# Patient Record
Sex: FEMALE | Race: WHITE | NOT HISPANIC OR LATINO | Employment: FULL TIME | URBAN - METROPOLITAN AREA
[De-identification: names, ages, dates, MRNs, and addresses within clinical notes are randomized per-mention and may not be internally consistent; named-entity substitution may affect disease eponyms.]

---

## 2020-11-24 ENCOUNTER — OFFICE VISIT (OUTPATIENT)
Dept: FAMILY MEDICINE | Facility: CLINIC | Age: 60
End: 2020-11-24
Payer: COMMERCIAL

## 2020-11-24 VITALS
DIASTOLIC BLOOD PRESSURE: 86 MMHG | BODY MASS INDEX: 29.77 KG/M2 | HEART RATE: 80 BPM | HEIGHT: 68 IN | SYSTOLIC BLOOD PRESSURE: 120 MMHG | WEIGHT: 196.44 LBS | RESPIRATION RATE: 18 BRPM | TEMPERATURE: 98 F | OXYGEN SATURATION: 98 %

## 2020-11-24 DIAGNOSIS — R10.9 FLANK PAIN: Primary | ICD-10-CM

## 2020-11-24 DIAGNOSIS — E66.3 OVERWEIGHT (BMI 25.0-29.9): ICD-10-CM

## 2020-11-24 DIAGNOSIS — Z12.31 ENCOUNTER FOR SCREENING MAMMOGRAM FOR BREAST CANCER: ICD-10-CM

## 2020-11-24 DIAGNOSIS — R10.32 LEFT LOWER QUADRANT ABDOMINAL PAIN: ICD-10-CM

## 2020-11-24 LAB
BACTERIA #/AREA URNS AUTO: NORMAL /HPF
BILIRUB UR QL STRIP: NEGATIVE
CLARITY UR REFRACT.AUTO: CLEAR
COLOR UR AUTO: ABNORMAL
GLUCOSE UR QL STRIP: NEGATIVE
HGB UR QL STRIP: ABNORMAL
KETONES UR QL STRIP: NEGATIVE
LEUKOCYTE ESTERASE UR QL STRIP: ABNORMAL
MICROSCOPIC COMMENT: NORMAL
NITRITE UR QL STRIP: NEGATIVE
PH UR STRIP: 5 [PH] (ref 5–8)
PROT UR QL STRIP: NEGATIVE
RBC #/AREA URNS AUTO: 0 /HPF (ref 0–4)
SP GR UR STRIP: 1.01 (ref 1–1.03)
SQUAMOUS #/AREA URNS AUTO: 1 /HPF
URN SPEC COLLECT METH UR: ABNORMAL
WBC #/AREA URNS AUTO: 2 /HPF (ref 0–5)

## 2020-11-24 PROCEDURE — 3008F PR BODY MASS INDEX (BMI) DOCUMENTED: ICD-10-PCS | Mod: CPTII,S$GLB,, | Performed by: FAMILY MEDICINE

## 2020-11-24 PROCEDURE — 99203 PR OFFICE/OUTPT VISIT, NEW, LEVL III, 30-44 MIN: ICD-10-PCS | Mod: 25,S$GLB,, | Performed by: FAMILY MEDICINE

## 2020-11-24 PROCEDURE — 99999 PR PBB SHADOW E&M-EST. PATIENT-LVL IV: ICD-10-PCS | Mod: PBBFAC,,, | Performed by: FAMILY MEDICINE

## 2020-11-24 PROCEDURE — 1125F AMNT PAIN NOTED PAIN PRSNT: CPT | Mod: S$GLB,,, | Performed by: FAMILY MEDICINE

## 2020-11-24 PROCEDURE — 1125F PR PAIN SEVERITY QUANTIFIED, PAIN PRESENT: ICD-10-PCS | Mod: S$GLB,,, | Performed by: FAMILY MEDICINE

## 2020-11-24 PROCEDURE — 87086 URINE CULTURE/COLONY COUNT: CPT

## 2020-11-24 PROCEDURE — 81002 URINALYSIS NONAUTO W/O SCOPE: CPT | Mod: S$GLB,,, | Performed by: FAMILY MEDICINE

## 2020-11-24 PROCEDURE — 81002 POCT URINE DIPSTICK WITHOUT MICROSCOPE: ICD-10-PCS | Mod: S$GLB,,, | Performed by: FAMILY MEDICINE

## 2020-11-24 PROCEDURE — 99203 OFFICE O/P NEW LOW 30 MIN: CPT | Mod: 25,S$GLB,, | Performed by: FAMILY MEDICINE

## 2020-11-24 PROCEDURE — 3008F BODY MASS INDEX DOCD: CPT | Mod: CPTII,S$GLB,, | Performed by: FAMILY MEDICINE

## 2020-11-24 PROCEDURE — 81001 URINALYSIS AUTO W/SCOPE: CPT

## 2020-11-24 PROCEDURE — 99999 PR PBB SHADOW E&M-EST. PATIENT-LVL IV: CPT | Mod: PBBFAC,,, | Performed by: FAMILY MEDICINE

## 2020-11-24 NOTE — PROGRESS NOTES
Subjective:       Patient ID: Juli Fajardo is a 60 y.o. female.    Chief Complaint: Abdominal Pain    This patient is new to me and has not been in the clinic in over 5 years.   Rosalva presents to the clinic today with complaints of left lower side pain. Patient states it comes and goes for about am onth now. Patient states she has been cleaning house and thought it may be a muscle strain but it has persisted. Patient states she started menopause 10 years ago or so and has not had a period for some times now. Patient states the pain is like a burning but not burning on urination.   Patient educated on plan of care, verbalized understanding.    Review of Systems   Constitutional: Negative for activity change, appetite change, chills, diaphoresis and fever.   HENT: Negative for congestion, ear pain, postnasal drip, sinus pressure, sneezing and sore throat.    Eyes: Negative for pain, discharge, redness and itching.   Respiratory: Negative for apnea, cough, chest tightness, shortness of breath and wheezing.    Cardiovascular: Negative for chest pain and leg swelling.   Gastrointestinal: Negative for abdominal distention, abdominal pain, constipation, diarrhea, nausea and vomiting.   Genitourinary: Positive for pelvic pain. Negative for difficulty urinating, dysuria, flank pain and frequency.   Skin: Negative for color change, rash and wound.   Neurological: Negative for dizziness.       Patient Active Problem List   Diagnosis    ALLERGIC RHINITIS       Objective:      Physical Exam  Vitals signs reviewed.   Constitutional:       General: She is not in acute distress.     Appearance: Normal appearance. She is well-developed.   HENT:      Head: Normocephalic.      Nose: Nose normal.   Eyes:      Conjunctiva/sclera: Conjunctivae normal.      Pupils: Pupils are equal, round, and reactive to light.   Neck:      Musculoskeletal: Normal range of motion and neck supple.   Cardiovascular:      Rate and Rhythm: Normal rate and  regular rhythm.      Heart sounds: Normal heart sounds.   Pulmonary:      Effort: Pulmonary effort is normal. No respiratory distress.      Breath sounds: Normal breath sounds.   Abdominal:      General: There is no distension.      Palpations: Abdomen is soft.      Tenderness: There is no abdominal tenderness. There is no right CVA tenderness, left CVA tenderness, guarding or rebound. Negative signs include Reddy's sign and McBurney's sign.      Comments: LLQ pain reported. No pain on palpation.    Skin:     General: Skin is warm and dry.      Findings: No rash.   Neurological:      Mental Status: She is alert and oriented to person, place, and time.   Psychiatric:         Mood and Affect: Mood normal.         Behavior: Behavior normal.         Lab Results   Component Value Date    WBC 5.52 01/28/2012    HGB 13.2 01/28/2012    HCT 41.0 01/28/2012     01/28/2012    CHOL 197 01/28/2012    TRIG 82 01/28/2012    HDL 69 01/28/2012    ALT 20 01/28/2012    AST 29 01/28/2012     01/28/2012    K 4.1 01/28/2012     01/28/2012    CREATININE 0.7 01/28/2012    BUN 12 01/28/2012    CO2 24 01/28/2012    TSH 1.488 01/28/2012     The ASCVD Risk score (Taylor DC Jr., et al., 2013) failed to calculate for the following reasons:    Cannot find a previous HDL lab    Cannot find a previous total cholesterol lab    Assessment:       1. Flank pain    2. Left lower quadrant abdominal pain    3. Overweight (BMI 25.0-29.9)    4. Encounter for screening mammogram for breast cancer        Plan:       Juli was seen today for abdominal pain.    Diagnoses and all orders for this visit:    Flank pain  -     POCT urine dipstick without microscope  -     Urinalysis  -     Urinalysis Microscopic  -     Urine culture    Left lower quadrant abdominal pain  -     US Pelvis Complete Non OB; Future    Overweight (BMI 25.0-29.9)  Comments:  today 29.87  Continue healthy diet and regular exercise as tolerated.  Continue medications as  prescribed.  Follow up with PCP    Encounter for screening mammogram for breast cancer  -     Mammo Digital Screening Bilat w/ Ted; Future      Follow up if symptoms worsen or fail to improve.

## 2020-11-25 ENCOUNTER — HOSPITAL ENCOUNTER (OUTPATIENT)
Dept: RADIOLOGY | Facility: HOSPITAL | Age: 60
Discharge: HOME | End: 2020-11-25
Attending: FAMILY MEDICINE
Payer: COMMERCIAL

## 2020-11-25 DIAGNOSIS — R10.32 LEFT LOWER QUADRANT ABDOMINAL PAIN: ICD-10-CM

## 2020-11-25 LAB
BILIRUB SERPL-MCNC: ABNORMAL MG/DL
BLOOD URINE, POC: ABNORMAL
CLARITY, POC UA: CLEAR
COLOR, POC UA: YELLOW
GLUCOSE UR QL STRIP: ABNORMAL
KETONES UR QL STRIP: ABNORMAL
LEUKOCYTE ESTERASE URINE, POC: ABNORMAL
NITRITE, POC UA: ABNORMAL
PH, POC UA: 5
PROTEIN, POC: ABNORMAL
SPECIFIC GRAVITY, POC UA: 1.01
UROBILINOGEN, POC UA: ABNORMAL

## 2020-11-25 PROCEDURE — 76856 US PELVIS COMP WITH TRANSVAG NON-OB (XPD): ICD-10-PCS | Mod: 26,,, | Performed by: RADIOLOGY

## 2020-11-25 PROCEDURE — 76830 US PELVIS COMP WITH TRANSVAG NON-OB (XPD): ICD-10-PCS | Mod: 26,,, | Performed by: RADIOLOGY

## 2020-11-25 PROCEDURE — 76830 TRANSVAGINAL US NON-OB: CPT | Mod: 26,,, | Performed by: RADIOLOGY

## 2020-11-25 PROCEDURE — 76856 US EXAM PELVIC COMPLETE: CPT | Mod: 26,,, | Performed by: RADIOLOGY

## 2020-11-25 PROCEDURE — 76830 TRANSVAGINAL US NON-OB: CPT | Mod: TC

## 2020-11-26 LAB — BACTERIA UR CULT: NO GROWTH

## 2020-11-30 ENCOUNTER — HOSPITAL ENCOUNTER (OUTPATIENT)
Dept: RADIOLOGY | Facility: HOSPITAL | Age: 60
Discharge: HOME | End: 2020-11-30
Attending: FAMILY MEDICINE
Payer: COMMERCIAL

## 2020-11-30 DIAGNOSIS — Z12.31 ENCOUNTER FOR SCREENING MAMMOGRAM FOR BREAST CANCER: ICD-10-CM

## 2020-11-30 PROCEDURE — 77067 MAMMO DIGITAL SCREENING BILAT WITH TOMO: ICD-10-PCS | Mod: 26,,, | Performed by: RADIOLOGY

## 2020-11-30 PROCEDURE — 77063 MAMMO DIGITAL SCREENING BILAT WITH TOMO: ICD-10-PCS | Mod: 26,,, | Performed by: RADIOLOGY

## 2020-11-30 PROCEDURE — 77063 BREAST TOMOSYNTHESIS BI: CPT | Mod: 26,,, | Performed by: RADIOLOGY

## 2020-11-30 PROCEDURE — 77067 SCR MAMMO BI INCL CAD: CPT | Mod: TC

## 2020-11-30 PROCEDURE — 77067 SCR MAMMO BI INCL CAD: CPT | Mod: 26,,, | Performed by: RADIOLOGY

## 2020-12-01 ENCOUNTER — OFFICE VISIT (OUTPATIENT)
Dept: FAMILY MEDICINE | Facility: CLINIC | Age: 60
End: 2020-12-01
Payer: COMMERCIAL

## 2020-12-01 VITALS
DIASTOLIC BLOOD PRESSURE: 82 MMHG | TEMPERATURE: 98 F | HEIGHT: 68 IN | SYSTOLIC BLOOD PRESSURE: 126 MMHG | RESPIRATION RATE: 18 BRPM | WEIGHT: 196.44 LBS | BODY MASS INDEX: 29.77 KG/M2 | OXYGEN SATURATION: 98 % | HEART RATE: 81 BPM

## 2020-12-01 DIAGNOSIS — Z01.419 ENCOUNTER FOR WELL WOMAN EXAM WITH ROUTINE GYNECOLOGICAL EXAM: Primary | ICD-10-CM

## 2020-12-01 DIAGNOSIS — E66.3 OVERWEIGHT (BMI 25.0-29.9): ICD-10-CM

## 2020-12-01 PROCEDURE — 99396 PREV VISIT EST AGE 40-64: CPT | Mod: 25,S$GLB,, | Performed by: FAMILY MEDICINE

## 2020-12-01 PROCEDURE — 3008F BODY MASS INDEX DOCD: CPT | Mod: CPTII,S$GLB,, | Performed by: FAMILY MEDICINE

## 2020-12-01 PROCEDURE — 88175 CYTOPATH C/V AUTO FLUID REDO: CPT

## 2020-12-01 PROCEDURE — 3008F PR BODY MASS INDEX (BMI) DOCUMENTED: ICD-10-PCS | Mod: CPTII,S$GLB,, | Performed by: FAMILY MEDICINE

## 2020-12-01 PROCEDURE — 1126F AMNT PAIN NOTED NONE PRSNT: CPT | Mod: S$GLB,,, | Performed by: FAMILY MEDICINE

## 2020-12-01 PROCEDURE — 1126F PR PAIN SEVERITY QUANTIFIED, NO PAIN PRESENT: ICD-10-PCS | Mod: S$GLB,,, | Performed by: FAMILY MEDICINE

## 2020-12-01 PROCEDURE — 87624 HPV HI-RISK TYP POOLED RSLT: CPT

## 2020-12-01 PROCEDURE — 99999 PR PBB SHADOW E&M-EST. PATIENT-LVL IV: CPT | Mod: PBBFAC,,, | Performed by: FAMILY MEDICINE

## 2020-12-01 PROCEDURE — 99396 PR PREVENTIVE VISIT,EST,40-64: ICD-10-PCS | Mod: 25,S$GLB,, | Performed by: FAMILY MEDICINE

## 2020-12-01 PROCEDURE — 99999 PR PBB SHADOW E&M-EST. PATIENT-LVL IV: ICD-10-PCS | Mod: PBBFAC,,, | Performed by: FAMILY MEDICINE

## 2020-12-01 RX ORDER — LORATADINE 10 MG/1
TABLET ORAL
COMMUNITY

## 2020-12-03 NOTE — PROGRESS NOTES
Subjective:       Patient ID: Juli Fajardo is a 60 y.o. female.    Chief Complaint: Gynecologic Exam    Mrs Bustos presents to the clinic today for her woman's wellness exam. Patient reports she has not had a pap smear in a very long time. Patient has no complaints today.   Patient educated on plan of care, verbalized understanding.    Review of Systems   Constitutional: Negative for activity change, appetite change, chills, diaphoresis and fever.   HENT: Negative for congestion, ear pain, postnasal drip, sinus pressure, sneezing and sore throat.    Eyes: Negative for pain, discharge, redness and itching.   Respiratory: Negative for apnea, cough, chest tightness, shortness of breath and wheezing.    Cardiovascular: Negative for chest pain and leg swelling.   Gastrointestinal: Negative for abdominal distention, abdominal pain, constipation, diarrhea, nausea and vomiting.   Genitourinary: Negative for difficulty urinating, dysuria, flank pain and frequency.   Skin: Negative for color change, rash and wound.   Neurological: Negative for dizziness.       Patient Active Problem List   Diagnosis    ALLERGIC RHINITIS      Routine Gyn Exam: Patient here for routine exam.      Gynecologic History  No LMP recorded. Patient is postmenopausal.  Contraception: none  Last Pap: years ago  Results: normal  Last Mammogram: years ago  Results: normal      Objective:      Physical Exam  Exam conducted with a chaperone present.   Constitutional:       General: She is not in acute distress.     Appearance: She is well-developed.   HENT:      Head: Normocephalic.      Right Ear: External ear normal.      Left Ear: External ear normal.      Nose: Nose normal.   Eyes:      Conjunctiva/sclera: Conjunctivae normal.      Pupils: Pupils are equal, round, and reactive to light.   Neck:      Musculoskeletal: Normal range of motion and neck supple.   Cardiovascular:      Rate and Rhythm: Normal rate.   Pulmonary:      Effort: Pulmonary effort is  normal. No respiratory distress.   Abdominal:      General: There is no distension.      Palpations: Abdomen is soft.      Tenderness: There is no abdominal tenderness.   Genitourinary:     General: Normal vulva.      Exam position: Lithotomy position.      Vagina: Normal.      Cervix: Normal.      Uterus: Normal.       Rectum: Normal.   Skin:     General: Skin is warm and dry.      Findings: No rash.   Neurological:      Mental Status: She is alert and oriented to person, place, and time.   Psychiatric:         Behavior: Behavior normal.         Lab Results   Component Value Date    WBC 5.52 01/28/2012    HGB 13.2 01/28/2012    HCT 41.0 01/28/2012     01/28/2012    CHOL 197 01/28/2012    TRIG 82 01/28/2012    HDL 69 01/28/2012    ALT 20 01/28/2012    AST 29 01/28/2012     01/28/2012    K 4.1 01/28/2012     01/28/2012    CREATININE 0.7 01/28/2012    BUN 12 01/28/2012    CO2 24 01/28/2012    TSH 1.488 01/28/2012     The ASCVD Risk score (Annette CURLY Jr., et al., 2013) failed to calculate for the following reasons:    Cannot find a previous HDL lab    Cannot find a previous total cholesterol lab    Assessment:       1. Encounter for well woman exam with routine gynecological exam    2. Overweight (BMI 25.0-29.9)        Plan:       Juli was seen today for gynecologic exam.    Diagnoses and all orders for this visit:    Encounter for well woman exam with routine gynecological exam  -     Liquid-Based Pap Smear, Screening  -     HPV High Risk Genotypes, PCR    Overweight (BMI 25.0-29.9)  Body mass index is 29.87 kg/m².  Continue healthy diet and regular exercise as tolerated.  Continue medications as prescribed.  Follow up with PCP      Follow up in about 1 year (around 12/1/2021).

## 2020-12-09 LAB
HPV HR 12 DNA SPEC QL NAA+PROBE: NEGATIVE
HPV16 AG SPEC QL: NEGATIVE
HPV18 DNA SPEC QL NAA+PROBE: NEGATIVE

## 2020-12-22 ENCOUNTER — TELEPHONE (OUTPATIENT)
Dept: FAMILY MEDICINE | Facility: CLINIC | Age: 60
End: 2020-12-22

## 2020-12-22 NOTE — TELEPHONE ENCOUNTER
Advised patient we are still waiting on results from the pap to come back and we will contact her once it has been received. Patient verbalized understanding.

## 2021-01-14 LAB
FINAL PATHOLOGIC DIAGNOSIS: NORMAL
Lab: NORMAL

## 2021-05-10 ENCOUNTER — PATIENT MESSAGE (OUTPATIENT)
Dept: RESEARCH | Facility: HOSPITAL | Age: 61
End: 2021-05-10

## 2022-02-11 NOTE — TELEPHONE ENCOUNTER
----- Message from Kate Navarro sent at 12/21/2020  4:52 PM CST -----  Regarding: results  Contact: [phone]  Pt is calling to find out the results or the test taken  on 11/30/2020 . Please contact pt back  [phone]     [employer] Electronic PA form for Prescription Benefits received from [employer].     Form completed and signed by Dr. Atif Clark. Form as well as appeal letter and most recent OV note faxed to [employer] at [phone].     For any updates on PA form and appeal may call [employer] at [phone].

## 2023-11-06 ENCOUNTER — OFFICE VISIT (OUTPATIENT)
Dept: FAMILY MEDICINE | Facility: CLINIC | Age: 63
End: 2023-11-06
Payer: COMMERCIAL

## 2023-11-06 ENCOUNTER — PATIENT MESSAGE (OUTPATIENT)
Dept: GASTROENTEROLOGY | Facility: CLINIC | Age: 63
End: 2023-11-06
Payer: COMMERCIAL

## 2023-11-06 VITALS
SYSTOLIC BLOOD PRESSURE: 134 MMHG | BODY MASS INDEX: 30.04 KG/M2 | WEIGHT: 198.19 LBS | HEART RATE: 91 BPM | HEIGHT: 68 IN | RESPIRATION RATE: 18 BRPM | TEMPERATURE: 98 F | DIASTOLIC BLOOD PRESSURE: 82 MMHG | OXYGEN SATURATION: 98 %

## 2023-11-06 DIAGNOSIS — Z12.12 ENCOUNTER FOR SCREENING FOR COLORECTAL MALIGNANT NEOPLASM: ICD-10-CM

## 2023-11-06 DIAGNOSIS — Z12.31 ENCOUNTER FOR SCREENING MAMMOGRAM FOR MALIGNANT NEOPLASM OF BREAST: ICD-10-CM

## 2023-11-06 DIAGNOSIS — Z76.89 ENCOUNTER TO ESTABLISH CARE: ICD-10-CM

## 2023-11-06 DIAGNOSIS — Z12.11 ENCOUNTER FOR SCREENING FOR COLORECTAL MALIGNANT NEOPLASM: ICD-10-CM

## 2023-11-06 DIAGNOSIS — E78.00 PURE HYPERCHOLESTEROLEMIA: ICD-10-CM

## 2023-11-06 DIAGNOSIS — R73.03 PREDIABETES: Primary | ICD-10-CM

## 2023-11-06 PROCEDURE — 3079F PR MOST RECENT DIASTOLIC BLOOD PRESSURE 80-89 MM HG: ICD-10-PCS | Mod: CPTII,S$GLB,, | Performed by: STUDENT IN AN ORGANIZED HEALTH CARE EDUCATION/TRAINING PROGRAM

## 2023-11-06 PROCEDURE — 99213 OFFICE O/P EST LOW 20 MIN: CPT | Mod: S$GLB,,, | Performed by: STUDENT IN AN ORGANIZED HEALTH CARE EDUCATION/TRAINING PROGRAM

## 2023-11-06 PROCEDURE — 3008F BODY MASS INDEX DOCD: CPT | Mod: CPTII,S$GLB,, | Performed by: STUDENT IN AN ORGANIZED HEALTH CARE EDUCATION/TRAINING PROGRAM

## 2023-11-06 PROCEDURE — 1159F PR MEDICATION LIST DOCUMENTED IN MEDICAL RECORD: ICD-10-PCS | Mod: CPTII,S$GLB,, | Performed by: STUDENT IN AN ORGANIZED HEALTH CARE EDUCATION/TRAINING PROGRAM

## 2023-11-06 PROCEDURE — 99213 PR OFFICE/OUTPT VISIT, EST, LEVL III, 20-29 MIN: ICD-10-PCS | Mod: S$GLB,,, | Performed by: STUDENT IN AN ORGANIZED HEALTH CARE EDUCATION/TRAINING PROGRAM

## 2023-11-06 PROCEDURE — 1159F MED LIST DOCD IN RCRD: CPT | Mod: CPTII,S$GLB,, | Performed by: STUDENT IN AN ORGANIZED HEALTH CARE EDUCATION/TRAINING PROGRAM

## 2023-11-06 PROCEDURE — 99999 PR PBB SHADOW E&M-EST. PATIENT-LVL V: ICD-10-PCS | Mod: PBBFAC,,, | Performed by: STUDENT IN AN ORGANIZED HEALTH CARE EDUCATION/TRAINING PROGRAM

## 2023-11-06 PROCEDURE — 1160F RVW MEDS BY RX/DR IN RCRD: CPT | Mod: CPTII,S$GLB,, | Performed by: STUDENT IN AN ORGANIZED HEALTH CARE EDUCATION/TRAINING PROGRAM

## 2023-11-06 PROCEDURE — 3075F SYST BP GE 130 - 139MM HG: CPT | Mod: CPTII,S$GLB,, | Performed by: STUDENT IN AN ORGANIZED HEALTH CARE EDUCATION/TRAINING PROGRAM

## 2023-11-06 PROCEDURE — 3075F PR MOST RECENT SYSTOLIC BLOOD PRESS GE 130-139MM HG: ICD-10-PCS | Mod: CPTII,S$GLB,, | Performed by: STUDENT IN AN ORGANIZED HEALTH CARE EDUCATION/TRAINING PROGRAM

## 2023-11-06 PROCEDURE — 99999 PR PBB SHADOW E&M-EST. PATIENT-LVL V: CPT | Mod: PBBFAC,,, | Performed by: STUDENT IN AN ORGANIZED HEALTH CARE EDUCATION/TRAINING PROGRAM

## 2023-11-06 PROCEDURE — 3008F PR BODY MASS INDEX (BMI) DOCUMENTED: ICD-10-PCS | Mod: CPTII,S$GLB,, | Performed by: STUDENT IN AN ORGANIZED HEALTH CARE EDUCATION/TRAINING PROGRAM

## 2023-11-06 PROCEDURE — 3079F DIAST BP 80-89 MM HG: CPT | Mod: CPTII,S$GLB,, | Performed by: STUDENT IN AN ORGANIZED HEALTH CARE EDUCATION/TRAINING PROGRAM

## 2023-11-06 PROCEDURE — 1160F PR REVIEW ALL MEDS BY PRESCRIBER/CLIN PHARMACIST DOCUMENTED: ICD-10-PCS | Mod: CPTII,S$GLB,, | Performed by: STUDENT IN AN ORGANIZED HEALTH CARE EDUCATION/TRAINING PROGRAM

## 2023-11-06 RX ORDER — BLOOD-GLUCOSE SENSOR
1 EACH MISCELLANEOUS
Qty: 1 EACH | Refills: 5 | Status: SHIPPED | OUTPATIENT
Start: 2023-11-06 | End: 2024-02-04

## 2023-11-06 NOTE — PROGRESS NOTES
[facility] Note    Subjective:  Chief Complaint:   Chief Complaint   Patient presents with    Establish Care       History of Present Illness:  Juli is a pleasant intelligent patient who is here for establishing care . Feels well today.     Recent diagnosis of prediabetes  A1c 5.9 (10/31/23)    HLD    Allergies  Flonase  Claritin    Mammo 3 years ago, will order  PAP, pt to schedule  CRC screening, due for first   Recommend COVID, RSV, Shingles vaccinations.     Screening  Health Maintenance         Date Due Completion Date    Hepatitis C Screening Never done ---    HIV Screening Never done ---    TETANUS VACCINE Never done ---    Colorectal Cancer Screening Never done ---    Shingles Vaccine (1 of 2) Never done ---    Lipid Panel 01/28/2017 1/28/2012    Mammogram 11/30/2021 11/30/2020    RSV Vaccine (Age 60+) (1 - 1-dose 60+ series) Never done ---    COVID-19 Vaccine (2 - 2023-24 season) 09/01/2023 3/13/2021    Cervical Cancer Screening 12/01/2025 12/1/2020          Immunization History   Administered Date(s) Administered    COVID-19, vector-nr, rS-Ad26, PF ([employer]) 03/13/2021     The ASCVD Risk score (Oren DK, et al., 2019) failed to calculate for the following reasons:    Cannot find a previous HDL lab    Cannot find a previous total cholesterol lab    Allergies:  Review of patient's allergies indicates:   Allergen Reactions    Ragweed Other (See Comments)       Home Medications:  Current Outpatient Medications on File Prior to Visit   Medication Sig    fluticasone propionate (FLONASE ALLERGY RELIEF NASL)     loratadine (CLARITIN) 10 mg tablet     multivit-iron-min-folic acid (MULTIVITAMIN-IRON-MINERALS-FOLIC ACID) 3,500-18-0.4 unit-mg-mg Chew Take by mouth.       No current facility-administered medications on file prior to visit.       Past Medical History:   Diagnosis Date    Allergy      History reviewed. No pertinent surgical history.  Family History   Problem Relation Age of Onset     "Diabetes Mother     Hypertension Mother     Heart disease Father     Breast cancer Maternal Grandmother      Social History     Tobacco Use    Smoking status: Never            The patient's past medical history, surgical history, social history, family history, allergies and medications have been reviewed.    Review of Systems     10 point review of systems was conducted and only the pertinent positives and pertinent negatives are noted above in the HPI section.    Physical Examination  General appearance: alert, cooperative, no distress  HEENT: normocephalic, atraumatic, PERRLA, TMs visualized bilaterally not impacted by cerumen, no nasal septal deviation, oropharynx mucosa pink and moist without tonsillar exudates  Neck: trachea midline, no LAD, no thyromegaly, no neck stiffness  Lungs: clear to auscultation, no wheezes, rales or rhonchi, symmetric air entry  Heart: normal rate, regular rhythm, normal S1, S2, no murmurs, rubs, clicks or gallops  Abdomen: soft, nontender, nondistended, no rigidity, rebound, or guarding.   Skin: No rashes or abnormal skin lesions, no apparent jaundice or bruising  Extremities: Full ROM of all extremities, peripheral pulses normal, no unilateral leg swelling or calf tenderness   Neurological:alert, oriented, normal speech, no new focal findings or movement disorder noted from baseline      BP Readings from Last 3 Encounters:   11/06/23 134/82   12/01/20 126/82   11/24/20 120/86     Wt Readings from Last 3 Encounters:   11/06/23 89.9 kg (198 lb 3.1 oz)   12/01/20 89.1 kg (196 lb 6.9 oz)   11/24/20 89.1 kg (196 lb 6.9 oz)     /82 (BP Location: Right arm, Patient Position: Sitting, BP Method: Medium (Manual))   Pulse 91   Temp 98.1 °F (36.7 °C) (Oral)   Resp 18   Ht 5' 8" (1.727 m)   Wt 89.9 kg (198 lb 3.1 oz)   SpO2 98%   BMI 30.14 kg/m²       274}  Laboratory: I have reviewed old labs below:       274}    Lab Results   Component Value Date    WBC 5.52 01/28/2012    HGB " 13.2 01/28/2012    HCT 41.0 01/28/2012    MCV 90.5 01/28/2012     01/28/2012     01/28/2012    K 4.1 01/28/2012     01/28/2012    CALCIUM 9.4 01/28/2012    CO2 24 01/28/2012    GLU 87 01/28/2012    BUN 12 01/28/2012    CREATININE 0.7 01/28/2012    ANIONGAP 13.0 01/28/2012    PROT 6.9 01/28/2012    ALBUMIN 3.9 01/28/2012    BILITOT 0.7 01/28/2012    ALKPHOS 50 (L) 01/28/2012    ALT 20 01/28/2012    AST 29 01/28/2012    CHOL 197 01/28/2012    TRIG 82 01/28/2012    HDL 69 01/28/2012    LDLCALC 112.0 01/28/2012    TSH 1.488 01/28/2012      Lab reviewed by me: Particular labs of significance that I will monitor, workup, or treat to improve are mentioned below in diagnostic impression remarks.    Imaging/EKG: I have reviewed the pertinent results and my findings are noted in remarks.     274}    CC:   Chief Complaint   Patient presents with    Establish Care           274}    Assessment/Plan  Juli Fajardo is a 63 y.o. female who presents to clinic with:  1. Prediabetes    2. Encounter to establish care    3. Pure hypercholesterolemia    4. BMI 30.0-30.9,adult    5. Encounter for screening for colorectal malignant neoplasm    6. Encounter for screening mammogram for malignant neoplasm of breast          274}  Diagnostic Impression Remarks       63 y.o. female who  has a past medical history of Allergy. presents to clinic today for est care and prediabetes education     This is the extent of this pleasant patient's concerns at this present time. She did not feel chest pain upon exertion, dyspnea, nausea, vomiting, diaphoresis, or syncope. No pleuritic chest pain, unilateral leg swelling, calf tenderness, or calf pain. Negative for unintentional weight loss night sweats, hematuria, and fevers. Juli will return to clinic in a few months for further workup and reassessment or sooner as needed. She was instructed to call the clinic or go to the emergency department or urgent care immediately if her symptoms  do not improve, worsens, or if any new symptoms develop. As we discussed that symptoms could worsen over the next 24 hours she was advised that if any increased swelling, pain, or numbness arise to go immediately to the ED. Patient knows to call any time if an emergency arises. Shared decision making occurred and she verbalized understanding in agreement with this plan. I discussed imaging findings, diagnosis, possibilities, treatment options, medications, risks, and benefits. She had many questions regarding the options and long-term effects. All questions were answered. She expressed understanding after counseling regarding the diagnosis and recommendations. She was capable and demonstrated competence with understanding of these options. Shared decision making was performed resulting in her choosing the current treatment plan. Patient handout was given with instructions and recommendations. Advised the patient that if they become pregnant to alert us immediately to assess for medication changes. Having a healthy weight can decrease the risk of 13 cancers and is an important goal. I also discussed the importance of close follow up to discuss labs, change or modify her medications if needed, monitor side effects, and further evaluation of medical problems.     Additional workup planned: see labs ordered below.    See below for labs and meds ordered with associated diagnosis      1. Encounter to establish care    2. Prediabetes  - Ambulatory referral/consult to Nutrition Services; Future  - blood-glucose sensor (FREESTYLE KD 3 SENSOR) Liane; 1 each by Misc.(Non-Drug; Combo Route) route every 14 (fourteen) days.  Dispense: 1 each; Refill: 5    3. Pure hypercholesterolemia  Stable. Monitor    4. BMI 30.0-30.9,adult  - Transition to a low salt, mediterranean-style diet which emphasizes:  Eating primarily plant-based foods, such as fruits and vegetables, whole grains, legumes and nuts   Replacing butter with healthy  fats, such as olive oil   Using herbs and spices instead of salt to flavor foods   Limiting red meat to no more than a few times a month   Eating fish and poultry at least twice a week      - Patient counseled on benefits of regular exercise.  Initiation of a graded aerobic exercise plan (goal 30-45 minutes per day 4-5 times per week)  Patient encouraged to participate in low intensity strength exercising and if unfamiliar with strength and conditioning training, I recommend joining a gym with access to a  to further instruct the patient.      - If weight/obesity is a concern a reasonable weight loss goal of 1-2lbs per month to reach a goal of 5-10% weight loss in 5-6 months, or a BMI less than 25.    5. Encounter for screening for colorectal malignant neoplasm  - Case Request Endoscopy: COLONOSCOPY    6. Encounter for screening mammogram for malignant neoplasm of breast  - Mammo Digital Screening Bilat w/ Ted; Future      RTC annually or PRN     Miryam Parnell MD, [facility]   Family Medicine Physician  11/06/2023      If you are due for any health screening(s) below please notify me so we can arrange them to be ordered and scheduled to maintain your health.     Health Maintenance Due   Topic    Colorectal Cancer Screening     Lipid Panel     Mammogram        Breast Cancer Screening    Breast cancer is the second most common cancer in women after skin cancer, and the second leading cause of death from cancer after lung cancer. Mammograms can detect breast cancer early, which significantly increases the chances of curing the cancer.      A screening mammogram is an x-ray image of the breasts used for early breast cancer detection. It can help reduce the number of deaths from breast cancer among women. To get a clear image, the breast is placed between two plastic plates to make it flat. How often a mammogram is needed depends on your age and your breast cancer risk.    Colon Cancer Screening    Of  cancers affecting both men and women, colorectal cancer is the third leading cancer killer in the [place]. But it doesnt have to be. Screening can prevent colorectal cancer or find it at an early stage when treatment often leads to a cure.    A colonoscopy is the preferred test for detecting colon cancer. It is needed only once every 10 years if results are negative. While sedated, a flexible, lighted tube with a tiny camera is inserted into the rectum and advanced through the colon to look for cancers. An alternative screening test that is used at home and returned to the lab may also be used. It detects hidden blood in bowel movements which could indicate cancer in the colon. If results are positive, you will need a colonoscopy to determine if the blood is a sign of cancer. This type of follow up (diagnostic) colonoscopy usually requires additional copays as required by your insurance provider. Please contact your PCP if you have any questions.              The following information is provided to all patients.  This information is to help you find resources for any of the problems found today that may be affecting your health:                Living healthy guide: www.[facility]       Understanding Diabetes: www.diabetes.org       Eating healthy: www.cdc.gov/healthyweight      [facility] home safety checklist: www.cdc.gov/steadi/patient.html      Agency on Aging: www.goea.[place].gov       Alcoholics anonymous (AA): www.aa.org      Physical Activity: www.alanna.nih.gov/gg2sehb       Tobacco use: www.quitwithusla.org           About The Book  Improve all areas of your health from your weight, sleep, cravings, mood, energy, skin, and even slow down aging, with easy-to-implement, science-based hacks to manage your blood sugar levels while still eating the foods you love.    Glucose, or blood sugar, is a tiny molecule in our body that has a huge impact on our health. It enters our bloodstream through the starchy  or sweet foods we eat. Ninety percent of us suffer from too much glucose in our system--and most of us don't know it.    The symptoms? Cravings, fatigue, infertility, hormonal issues, acne, wrinkles And over time, the development of conditions like type 2 diabetes, polycystic ovarian syndrome, cancer, dementia, and heart disease.    Drawing on cutting-edge science and her own pioneering research,  Rajani Mason offers ten simple, surprising hacks to help you balance your glucose levels and reverse your symptoms--without going on a diet or giving up the foods you love. For example:  * How eating foods in the right order will make you lose weight effortlessly  * What secret ingredient will allow you to eat dessert and still go into fat-burning mode  * What small change to your breakfast will unlock energy and cut your cravings    Both entertaining, informative, and packed with the latest scientific data, this book presents a new way to think about better health. Glucose Revolution is chock-full of tips that can drastically and immediately improve your life, whatever your dietary preferences.    About the author     Rajani Mason is on a mission to translate cutting-edge science into easy tips to help people improve their physical and mental health. Shes the  of the wildly popular [employer] account @[employer] where she teaches tens of thousands of people about healthy food habits. She holds a Bachelor of Science in mathematics from [employer], and a Master of Science in biochemistry from [employer]. 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 made her realize that food habits beat genetics for good health. Glucose Revolution is her first book.

## 2023-11-06 NOTE — PATIENT INSTRUCTIONS
Jose Bustos,     If you are due for any health screening(s) below please notify me so we can arrange them to be ordered and scheduled. Most healthy patients at your age complete them, but you are free to accept or refuse.     If you can't do it, I'll definitely understand. If you can, I'd certainly appreciate it!    Tests to Keep You Healthy    Mammogram: DUE  Colon Cancer Screening: DUE  Cervical Cancer Screening: Met on 12/1/2020      Schedule your breast cancer screening today     Breast cancer is the second most common cancer in women,  and the second leading cause of death from cancer. Mammograms can detect breast cancer early, which significantly increases the chances of curing the cancer.       Our records indicate that you may be overdue for breast cancer screening. Cancer screenings save lives, so schedule yours today to stay healthy.     If you recently had a mammogram performed outside of [facility], please let your Health care team know so that they can update your health record.        Its time for your colon cancer screening     Colorectal cancer is one of the leading causes of cancer death for men and women but it doesnt have to be. Screenings can prevent colorectal cancer or find it early enough to treat and cure the disease.     Our records indicate that you may be overdue for colon cancer screening. A colonoscopy or stool screening test can help identify patients at risk for developing colon cancer. Cancer screenings save lives, so schedule yours today to stay healthy.     A colonoscopy is the preferred test for detecting colon cancer. It is needed only once every 10 years if results are negative. While you are sedated, a flexible, lighted tube with a tiny camera is inserted into the rectum and advanced through the colon to look for cancers.     An alternative screening test that is used at home and returned to the lab may also be used. It detects hidden blood in bowel movements which could  indicate cancer in the colon. If results are positive, you will need a colonoscopy to determine if the blood is a sign of cancer. This type of follow up (diagnostic) colonoscopy usually requires additional copays as required by your insurance provider.     If you recently had your colon cancer screening performed outside of [facility], please let your Health care team know so that they can update your health record. Please contact your PCP if you have any questions.

## 2023-11-24 ENCOUNTER — HOSPITAL ENCOUNTER (OUTPATIENT)
Dept: RADIOLOGY | Facility: HOSPITAL | Age: 63
Discharge: HOME | End: 2023-11-24
Attending: STUDENT IN AN ORGANIZED HEALTH CARE EDUCATION/TRAINING PROGRAM
Payer: COMMERCIAL

## 2023-11-24 DIAGNOSIS — Z12.31 ENCOUNTER FOR SCREENING MAMMOGRAM FOR MALIGNANT NEOPLASM OF BREAST: ICD-10-CM

## 2023-11-24 PROCEDURE — 77063 MAMMO DIGITAL SCREENING BILAT WITH TOMO: ICD-10-PCS | Mod: 26,,, | Performed by: RADIOLOGY

## 2023-11-24 PROCEDURE — 77067 SCR MAMMO BI INCL CAD: CPT | Mod: 26,,, | Performed by: RADIOLOGY

## 2023-11-24 PROCEDURE — 77067 SCR MAMMO BI INCL CAD: CPT | Mod: TC

## 2023-11-24 PROCEDURE — 77063 BREAST TOMOSYNTHESIS BI: CPT | Mod: 26,,, | Performed by: RADIOLOGY

## 2023-11-24 PROCEDURE — 77067 MAMMO DIGITAL SCREENING BILAT WITH TOMO: ICD-10-PCS | Mod: 26,,, | Performed by: RADIOLOGY

## 2024-01-25 ENCOUNTER — PATIENT MESSAGE (OUTPATIENT)
Dept: GASTROENTEROLOGY | Facility: CLINIC | Age: 64
End: 2024-01-25
Payer: COMMERCIAL

## 2024-02-07 DIAGNOSIS — E11.9 TYPE 2 DIABETES MELLITUS WITHOUT COMPLICATION: ICD-10-CM

## 2024-04-22 ENCOUNTER — ANESTHESIA EVENT (OUTPATIENT)
Dept: ENDOSCOPY | Facility: HOSPITAL | Age: 64
End: 2024-04-22
Payer: COMMERCIAL

## 2024-04-22 ENCOUNTER — HOSPITAL ENCOUNTER (OUTPATIENT)
Facility: HOSPITAL | Age: 64
Discharge: HOME | End: 2024-04-22
Attending: INTERNAL MEDICINE | Admitting: INTERNAL MEDICINE
Payer: COMMERCIAL

## 2024-04-22 ENCOUNTER — ANESTHESIA (OUTPATIENT)
Dept: ENDOSCOPY | Facility: HOSPITAL | Age: 64
End: 2024-04-22
Payer: COMMERCIAL

## 2024-04-22 DIAGNOSIS — Z12.11 SCREENING FOR COLON CANCER: ICD-10-CM

## 2024-04-22 DIAGNOSIS — K64.8 INTERNAL HEMORRHOIDS: ICD-10-CM

## 2024-04-22 DIAGNOSIS — K63.5 POLYP OF COLON, UNSPECIFIED PART OF COLON, UNSPECIFIED TYPE: Primary | ICD-10-CM

## 2024-04-22 PROCEDURE — D9220A PRA ANESTHESIA: Mod: 33,ANES,, | Performed by: ANESTHESIOLOGY

## 2024-04-22 PROCEDURE — 45385 COLONOSCOPY W/LESION REMOVAL: CPT | Mod: PT | Performed by: INTERNAL MEDICINE

## 2024-04-22 PROCEDURE — 37000009 HC ANESTHESIA EA ADD 15 MINS: Performed by: INTERNAL MEDICINE

## 2024-04-22 PROCEDURE — 27201012 HC FORCEPS, HOT/COLD, DISP: Performed by: INTERNAL MEDICINE

## 2024-04-22 PROCEDURE — 45380 COLONOSCOPY AND BIOPSY: CPT | Mod: PT,59 | Performed by: INTERNAL MEDICINE

## 2024-04-22 PROCEDURE — 45380 COLONOSCOPY AND BIOPSY: CPT | Mod: 33,59,, | Performed by: INTERNAL MEDICINE

## 2024-04-22 PROCEDURE — D9220A PRA ANESTHESIA: Mod: 33,CRNA,, | Performed by: NURSE ANESTHETIST, CERTIFIED REGISTERED

## 2024-04-22 PROCEDURE — 63600175 PHARM REV CODE 636 W HCPCS: Performed by: NURSE ANESTHETIST, CERTIFIED REGISTERED

## 2024-04-22 PROCEDURE — 45385 COLONOSCOPY W/LESION REMOVAL: CPT | Mod: 33,,, | Performed by: INTERNAL MEDICINE

## 2024-04-22 PROCEDURE — 37000008 HC ANESTHESIA 1ST 15 MINUTES: Performed by: INTERNAL MEDICINE

## 2024-04-22 PROCEDURE — 25000003 PHARM REV CODE 250: Performed by: INTERNAL MEDICINE

## 2024-04-22 PROCEDURE — 27201089 HC SNARE, DISP (ANY): Performed by: INTERNAL MEDICINE

## 2024-04-22 PROCEDURE — 25000003 PHARM REV CODE 250: Performed by: NURSE ANESTHETIST, CERTIFIED REGISTERED

## 2024-04-22 RX ORDER — SODIUM CHLORIDE 9 MG/ML
INJECTION, SOLUTION INTRAVENOUS CONTINUOUS
Status: DISCONTINUED | OUTPATIENT
Start: 2024-04-22 | End: 2024-04-22 | Stop reason: HOSPADM

## 2024-04-22 RX ORDER — LIDOCAINE HYDROCHLORIDE 10 MG/ML
INJECTION, SOLUTION EPIDURAL; INFILTRATION; INTRACAUDAL; PERINEURAL
Status: DISCONTINUED | OUTPATIENT
Start: 2024-04-22 | End: 2024-04-22

## 2024-04-22 RX ORDER — PROPOFOL 10 MG/ML
VIAL (ML) INTRAVENOUS
Status: DISCONTINUED | OUTPATIENT
Start: 2024-04-22 | End: 2024-04-22

## 2024-04-22 RX ADMIN — PROPOFOL 40 MG: 10 INJECTION, EMULSION INTRAVENOUS at 10:04

## 2024-04-22 RX ADMIN — PROPOFOL 40 MG: 10 INJECTION, EMULSION INTRAVENOUS at 09:04

## 2024-04-22 RX ADMIN — LIDOCAINE HYDROCHLORIDE 20 MG: 10 INJECTION, SOLUTION EPIDURAL; INFILTRATION; INTRACAUDAL; PERINEURAL at 09:04

## 2024-04-22 RX ADMIN — PROPOFOL 120 MG: 10 INJECTION, EMULSION INTRAVENOUS at 09:04

## 2024-04-22 RX ADMIN — PROPOFOL 30 MG: 10 INJECTION, EMULSION INTRAVENOUS at 10:04

## 2024-04-22 RX ADMIN — SODIUM CHLORIDE: 9 INJECTION, SOLUTION INTRAVENOUS at 09:04

## 2024-04-22 NOTE — PROVATION PATIENT INSTRUCTIONS
Discharge Summary/Instructions after an Endoscopic Procedure  Patient Name: Juli Ho  Patient MRN: [id number]  Patient YOB: 1960 Monday, April 22, 2024  Reilly Brooks MD  Dear patient,  As a result of recent federal legislation (The Federal Cures Act), you may   receive lab or pathology results from your procedure in your [facility]   account before your physician is able to contact you. Your physician or   their representative will relay the results to you with their   recommendations at their soonest availability.  Thank you,  RESTRICTIONS:  During your procedure today, you received medications for sedation.  These   medications may affect your judgment, balance and coordination.  Therefore,   for 24 hours, you have the following restrictions:   - DO NOT drive a car, operate machinery, make legal/financial decisions,   sign important papers or drink alcohol.    ACTIVITY:  Today: no heavy lifting, straining or running due to procedural   sedation/anesthesia.  The following day: return to full activity including work.  DIET:  Eat and drink normally unless instructed otherwise.     TREATMENT FOR COMMON SIDE EFFECTS:  - Mild abdominal pain, nausea, belching, bloating or excessive gas:  rest,   eat lightly and use a heating pad.  - Sore Throat: treat with throat lozenges and/or gargle with warm salt   water.  - Because air was used during the procedure, expelling large amounts of air   from your rectum or belching is normal.  - If a bowel prep was taken, you may not have a bowel movement for 1-3 days.    This is normal.  SYMPTOMS TO WATCH FOR AND REPORT TO YOUR PHYSICIAN:  1. Abdominal pain or bloating, other than gas cramps.  2. Chest pain.  3. Back pain.  4. Signs of infection such as: chills or fever occurring within 24 hours   after the procedure.  5. Rectal bleeding, which would show as bright red, maroon, or black stools.   (A tablespoon of blood from the rectum is not serious, especially if    hemorrhoids are present.)  6. Vomiting.  7. Weakness or dizziness.  GO DIRECTLY TO THE NEAREST EMERGENCY ROOM IF YOU HAVE ANY OF THE FOLLOWING:      Difficulty breathing              Chills and/or fever over 101 F   Persistent vomiting and/or vomiting blood   Severe abdominal pain   Severe chest pain   Black, tarry stools   Bleeding- more than one tablespoon   Any other symptom or condition that you feel may need urgent attention  Your doctor recommends these additional instructions:  If any biopsies were taken, your doctors clinic will contact you in 1 to 2   weeks with any results.  - Patient has a contact number available for emergencies.  The signs and   symptoms of potential delayed complications were discussed with the   patient.  Return to normal activities tomorrow.  Written discharge   instructions were provided to the patient.   - High fiber diet.   - Continue present medications.   - Await pathology results.   - Repeat colonoscopy in 3 years for surveillance.   - Discharge patient to home (ambulatory).   - Return to GI office PRN.  For questions, problems or results please call your physician - Reilly Brooks MD at Work:  [phone].  [facility], EMERGENCY ROOM PHONE NUMBER: [phone]  IF A COMPLICATION OR EMERGENCY SITUATION ARISES AND YOU ARE UNABLE TO REACH   YOUR PHYSICIAN - GO DIRECTLY TO THE EMERGENCY ROOM.  Reilly Brooks MD  4/22/2024 10:13:04 AM  This report has been verified and signed electronically.  Dear patient,  As a result of recent federal legislation (The Federal Cures Act), you may   receive lab or pathology results from your procedure in your [facility]   account before your physician is able to contact you. Your physician or   their representative will relay the results to you with their   recommendations at their soonest availability.  Thank you,  PROVATION

## 2024-04-22 NOTE — TRANSFER OF CARE
Anesthesia Transfer of Care Note    Patient: Juli Faajrdo    Procedure(s) Performed: Procedure(s) (LRB):  COLONOSCOPY (N/A)    Patient location: GI    Anesthesia Type: general    Transport from OR: Transported from OR on 2-3 L/min O2 by NC with adequate spontaneous ventilation    Post pain: adequate analgesia    Post assessment: no apparent anesthetic complications    Post vital signs: stable    Level of consciousness: awake    Nausea/Vomiting: no nausea/vomiting    Complications: none    Transfer of care protocol was followed      Last vitals: Visit Vitals  /76 (BP Location: Left arm, Patient Position: Lying)   Pulse 78   Temp 36.8 °C (98.2 °F) (Skin)   Resp 16   Wt 72.6 kg (160 lb)   SpO2 99%   Breastfeeding No   BMI 24.33 kg/m²

## 2024-04-22 NOTE — ANESTHESIA POSTPROCEDURE EVALUATION
Anesthesia Post Evaluation    Patient: Juli Fjaardo    Procedure(s) Performed: Procedure(s) (LRB):  COLONOSCOPY (N/A)    Final Anesthesia Type: general      Patient location during evaluation: PACU  Patient participation: Yes- Able to Participate  Level of consciousness: awake and alert  Post-procedure vital signs: reviewed and stable  Pain management: adequate  Airway patency: patent    PONV status at discharge: No PONV  Anesthetic complications: no      Cardiovascular status: hemodynamically stable  Respiratory status: unassisted and room air  Hydration status: euvolemic  Follow-up not needed.              Vitals Value Taken Time   /61 04/22/24 1037   Temp 36.7 °C (98.1 °F) 04/22/24 1037   Pulse 62 04/22/24 1037   Resp 16 04/22/24 1037   SpO2 99 % 04/22/24 1037         No case tracking events are documented in the log.      Pain/Giana Score: Giana Score: 10 (4/22/2024 10:36 AM)

## 2024-04-22 NOTE — TRANSFER OF CARE
Anesthesia Transfer of Care Note    Patient: Juli Fajardo    Procedure(s) Performed: Procedure(s) (LRB):  COLONOSCOPY (N/A)    Patient location: GI    Anesthesia Type: general    Transport from OR: Transported from OR on 2-3 L/min O2 by NC with adequate spontaneous ventilation    Post pain: adequate analgesia    Post assessment: no apparent anesthetic complications    Post vital signs: stable    Level of consciousness: awake    Nausea/Vomiting: no nausea/vomiting    Complications: none    Transfer of care protocol was followed      Last vitals: Visit Vitals  /76 (BP Location: Left arm, Patient Position: Lying)   Pulse 78   Temp 36.8 °C (98.2 °F) (Skin)   Resp 16   Wt 72.6 kg (160 lb)   SpO2 99%   Breastfeeding No   BMI 24.33 kg/m²

## 2024-04-22 NOTE — H&P
CC: Screening for colorectal cancer - first occurrence    63 year old female with above. States that symptoms are absent, no alleviating/exacerbating factors. No family history of colorectal CA. No personal history of polyps. No bleeding or weight loss.     ROS:  No headache, no fever/chills, no chest pain/SOB, no nausea/vomiting/diarrhea/constipation/GI bleeding/abdominal pain, no dysuria/hematuria.    VSSAF   Exam:   Alert and oriented x 3; no apparent distress   PERRLA, sclera anicteric  CV: Regular rate/rhythm, normal PMI   Lungs: Clear bilaterally with no wheeze/rales   Abdomen: Soft, NT/ND, normal bowel sounds   Ext: No cyanosis, clubbing     Impression:   As above    Plan:   Proceed with endoscopy. Further recs to follow.

## 2024-04-22 NOTE — ANESTHESIA PREPROCEDURE EVALUATION
04/22/2024  Juli Fajardo is a 63 y.o., female.      Pre-op Assessment    I have reviewed the Patient Summary Reports.     I have reviewed the Nursing Notes. I have reviewed the NPO Status.   I have reviewed the Medications.     Review of Systems  Anesthesia Hx:  No previous Anesthesia                Social:  Non-Smoker       Hematology/Oncology:  Hematology Normal   Oncology Normal                                   EENT/Dental:  chronic allergic rhinitis           Cardiovascular:                hyperlipidemia                             Pulmonary:  Pulmonary Normal                       Hepatic/GI:  Bowel Prep.                Musculoskeletal:  Musculoskeletal Normal                Neurological:  Neurology Normal                                      Endocrine:        Obesity / BMI > 30  Dermatological:  Skin Normal    Psych:  Psychiatric Normal                    Physical Exam  General: Well nourished, Cooperative, Alert and Oriented    Airway:  Mallampati: II   Mouth Opening: Normal  TM Distance: Normal  Tongue: Normal    Dental:  Intact    Chest/Lungs:  Normal Respiratory Rate    Heart:  Rate: Normal        Anesthesia Plan  Type of Anesthesia, risks & benefits discussed:    Anesthesia Type: Gen Natural Airway  Intra-op Monitoring Plan: Standard ASA Monitors  Induction:  IV  Informed Consent: Informed consent signed with the Patient and all parties understand the risks and agree with anesthesia plan.  All questions answered.   ASA Score: 2    Ready For Surgery From Anesthesia Perspective.     .

## 2024-04-23 VITALS
OXYGEN SATURATION: 99 % | RESPIRATION RATE: 16 BRPM | HEART RATE: 62 BPM | WEIGHT: 160 LBS | BODY MASS INDEX: 24.33 KG/M2 | DIASTOLIC BLOOD PRESSURE: 61 MMHG | TEMPERATURE: 98 F | SYSTOLIC BLOOD PRESSURE: 115 MMHG

## 2024-05-20 ENCOUNTER — OFFICE VISIT (OUTPATIENT)
Dept: FAMILY MEDICINE | Facility: CLINIC | Age: 64
End: 2024-05-20
Payer: COMMERCIAL

## 2024-05-20 VITALS
HEART RATE: 84 BPM | WEIGHT: 159.63 LBS | OXYGEN SATURATION: 100 % | BODY MASS INDEX: 24.19 KG/M2 | SYSTOLIC BLOOD PRESSURE: 128 MMHG | DIASTOLIC BLOOD PRESSURE: 78 MMHG | RESPIRATION RATE: 18 BRPM | HEIGHT: 68 IN

## 2024-05-20 DIAGNOSIS — Z11.4 ENCOUNTER FOR SCREENING FOR HIV: ICD-10-CM

## 2024-05-20 DIAGNOSIS — Z11.59 ENCOUNTER FOR HEPATITIS C SCREENING TEST FOR LOW RISK PATIENT: ICD-10-CM

## 2024-05-20 DIAGNOSIS — R73.03 PREDIABETES: Primary | ICD-10-CM

## 2024-05-20 DIAGNOSIS — E78.00 PURE HYPERCHOLESTEROLEMIA: ICD-10-CM

## 2024-05-20 PROCEDURE — 1160F RVW MEDS BY RX/DR IN RCRD: CPT | Mod: CPTII,S$GLB,, | Performed by: STUDENT IN AN ORGANIZED HEALTH CARE EDUCATION/TRAINING PROGRAM

## 2024-05-20 PROCEDURE — 3008F BODY MASS INDEX DOCD: CPT | Mod: CPTII,S$GLB,, | Performed by: STUDENT IN AN ORGANIZED HEALTH CARE EDUCATION/TRAINING PROGRAM

## 2024-05-20 PROCEDURE — 99999 PR PBB SHADOW E&M-EST. PATIENT-LVL IV: CPT | Mod: PBBFAC,,, | Performed by: STUDENT IN AN ORGANIZED HEALTH CARE EDUCATION/TRAINING PROGRAM

## 2024-05-20 PROCEDURE — 1159F MED LIST DOCD IN RCRD: CPT | Mod: CPTII,S$GLB,, | Performed by: STUDENT IN AN ORGANIZED HEALTH CARE EDUCATION/TRAINING PROGRAM

## 2024-05-20 PROCEDURE — 3074F SYST BP LT 130 MM HG: CPT | Mod: CPTII,S$GLB,, | Performed by: STUDENT IN AN ORGANIZED HEALTH CARE EDUCATION/TRAINING PROGRAM

## 2024-05-20 PROCEDURE — 3078F DIAST BP <80 MM HG: CPT | Mod: CPTII,S$GLB,, | Performed by: STUDENT IN AN ORGANIZED HEALTH CARE EDUCATION/TRAINING PROGRAM

## 2024-05-20 PROCEDURE — 99214 OFFICE O/P EST MOD 30 MIN: CPT | Mod: S$GLB,,, | Performed by: STUDENT IN AN ORGANIZED HEALTH CARE EDUCATION/TRAINING PROGRAM

## 2024-05-20 NOTE — PROGRESS NOTES
[facility] Note    Subjective:  Chief Complaint:   Chief Complaint   Patient presents with    Follow-up       History of Present Illness:  Juli is here for follow up     Recent diagnosis of prediabetes  A1c 5.9 (10/31/23)    BMI 30 > 24      HLD     The ASCVD Risk score (Oren HANNON, et al., 2019) failed to calculate for the following reasons:    Cannot find a previous HDL lab    Cannot find a previous total cholesterol lab    Colonoscopy 4/22/24 Repeat colonoscopy in 3 years    Allergies:  Review of patient's allergies indicates:   Allergen Reactions    Ragweed Other (See Comments)       Home Medications:  Current Outpatient Medications on File Prior to Visit   Medication Sig    fluticasone propionate (FLONASE ALLERGY RELIEF NASL)     loratadine (CLARITIN) 10 mg tablet     multivit-iron-min-folic acid (MULTIVITAMIN-IRON-MINERALS-FOLIC ACID) 3,500-18-0.4 unit-mg-mg Chew Take by mouth.      blood-glucose sensor (FREESTYLE KD 3 SENSOR) Liane 1 each by Misc.(Non-Drug; Combo Route) route every 14 (fourteen) days.     No current facility-administered medications on file prior to visit.       Past Medical History:   Diagnosis Date    Allergy     Pre-diabetes      Past Surgical History:   Procedure Laterality Date    COLONOSCOPY N/A 4/22/2024    Procedure: COLONOSCOPY;  Surgeon: Reilly Anthony MD;  [facility]: [facility];  Service: Endoscopy;  Laterality: N/A;  ppm     Family History   Problem Relation Name Age of Onset    Diabetes Mother      Hypertension Mother      Heart disease Father      Breast cancer Maternal Grandmother       Social History     Tobacco Use    Smoking status: Never   Substance Use Topics    Alcohol use: Yes     Comment: rarely    Drug use: Never            The patient's past medical history, surgical history, social history, family history, allergies and medications have been reviewed.    Review of Systems     10 point review of systems was conducted and only the pertinent positives  "and pertinent negatives are noted above in the HPI section.    Physical Examination  General appearance: alert, cooperative, no distress  HEENT: normocephalic, atraumatic, PERRLA  Neck: trachea midline, no LAD, no thyromegaly, no neck stiffness  Lungs: clear to auscultation, no wheezes, rales or rhonchi, symmetric air entry  Heart: normal rate, regular rhythm, normal S1, S2, no murmurs, rubs, clicks or gallops  Skin: No rashes or abnormal skin lesions, no apparent jaundice or bruising  Extremities: Full ROM of all extremities, peripheral pulses normal, no unilateral leg swelling or calf tenderness   Neurological:alert, oriented, normal speech, no new focal findings or movement disorder noted from baseline      BP Readings from Last 3 Encounters:   05/20/24 128/78   04/22/24 115/61   11/06/23 134/82     Wt Readings from Last 3 Encounters:   05/20/24 72.4 kg (159 lb 9.8 oz)   04/22/24 72.6 kg (160 lb)   11/06/23 89.9 kg (198 lb 3.1 oz)     /78 (BP Location: Right arm, Patient Position: Sitting, BP Method: Medium (Manual))   Pulse 84   Resp 18   Ht 5' 8" (1.727 m)   Wt 72.4 kg (159 lb 9.8 oz)   SpO2 100%   BMI 24.27 kg/m²    274}  Laboratory: I have reviewed old labs below:    274}    Lab Results   Component Value Date    WBC 5.52 01/28/2012    HGB 13.2 01/28/2012    HCT 41.0 01/28/2012    MCV 90.5 01/28/2012     01/28/2012     01/28/2012    K 4.1 01/28/2012     01/28/2012    CALCIUM 9.4 01/28/2012    CO2 24 01/28/2012    GLU 87 01/28/2012    BUN 12 01/28/2012    CREATININE 0.7 01/28/2012    ANIONGAP 13.0 01/28/2012    PROT 6.9 01/28/2012    ALBUMIN 3.9 01/28/2012    BILITOT 0.7 01/28/2012    ALKPHOS 50 (L) 01/28/2012    ALT 20 01/28/2012    AST 29 01/28/2012    CHOL 197 01/28/2012    TRIG 82 01/28/2012    HDL 69 01/28/2012    LDLCALC 112.0 01/28/2012    TSH 1.488 01/28/2012      Lab reviewed by me: Particular labs of significance that I will monitor, workup, or treat to improve are " mentioned below in diagnostic impression remarks.    Imaging/EKG: I have reviewed the pertinent results and my findings are noted in remarks.  274}    CC:   Chief Complaint   Patient presents with    Follow-up        274}    Assessment/Plan  Juli Fajardo is a 63 y.o. female who presents to clinic with:  1. Prediabetes    2. Pure hypercholesterolemia    3. BMI 30.0-30.9,adult    4. Encounter for screening for HIV    5. Encounter for hepatitis C screening test for low risk patient       274}  Diagnostic Impression Remarks       63 y.o. female who presents to clinic today for follow up     This is the extent of this pleasant patient's concerns at this present time.  I also discussed the importance of close follow up to discuss labs, change or modify her medications if needed, monitor side effects, and further evaluation of medical problems.     Additional workup planned: see labs ordered below.    See below for labs and meds ordered with associated diagnosis      1. Prediabetes  - Hemoglobin A1C; Future  - Comprehensive Metabolic Panel; Future  - CBC Auto Differential; Future  - Hemoglobin A1C  - Comprehensive Metabolic Panel  - CBC Auto Differential  - Microalbumin/Creatinine Ratio, Urine; Future  - Microalbumin/Creatinine Ratio, Urine    2. Pure hypercholesterolemia  - Lipid Panel; Future  - Lipid Panel    3. BMI 30.0-30.9,adult  - Hemoglobin A1C; Future  - Comprehensive Metabolic Panel; Future  - CBC Auto Differential; Future  - Hemoglobin A1C  - Comprehensive Metabolic Panel  - CBC Auto Differential  - TSH; Future  - Lipid Panel; Future  - TSH  - Lipid Panel  BMI 30 > 24   Significant weight loss with lifestyle modifications     4. Encounter for screening for HIV  - HIV 1/2 Ag/Ab (4th Gen); Future  - HIV 1/2 Ag/Ab (4th Gen)    5. Encounter for hepatitis C screening test for low risk patient  - Hepatitis C Antibody; Future  - Hepatitis C Antibody          RTC annually or PRN     Miryam Parnell MD,  [facility]   Family Medicine Physician  05/20/2024

## 2024-05-20 NOTE — PATIENT INSTRUCTIONS
Jose Bustos,     If you are due for any health screening(s) below please notify me so we can arrange them to be ordered and scheduled. Most healthy patients at your age complete them, but you are free to accept or refuse.     If you can't do it, I'll definitely understand. If you can, I'd certainly appreciate it!    All of your core healthy metrics are met.

## 2024-06-17 ENCOUNTER — TELEPHONE (OUTPATIENT)
Dept: FAMILY MEDICINE | Facility: CLINIC | Age: 64
End: 2024-06-17
Payer: COMMERCIAL

## 2024-06-17 DIAGNOSIS — E78.5 DYSLIPIDEMIA: Primary | ICD-10-CM

## 2024-06-17 PROBLEM — E78.00 PURE HYPERCHOLESTEROLEMIA: Status: RESOLVED | Noted: 2023-11-06 | Resolved: 2024-06-17
